# Patient Record
Sex: MALE | Employment: FULL TIME | URBAN - METROPOLITAN AREA
[De-identification: names, ages, dates, MRNs, and addresses within clinical notes are randomized per-mention and may not be internally consistent; named-entity substitution may affect disease eponyms.]

---

## 2021-04-20 DIAGNOSIS — Z00.00 ENCOUNTER FOR PREVENTIVE HEALTH EXAMINATION: ICD-10-CM

## 2021-04-29 ENCOUNTER — HOSPITAL ENCOUNTER (OUTPATIENT)
Dept: VASCULAR ULTRASOUND | Facility: HOSPITAL | Age: 61
Discharge: HOME | End: 2021-04-29

## 2021-04-29 ENCOUNTER — HOSPITAL ENCOUNTER (OUTPATIENT)
Dept: NON INVASIVE DIAGNOSTICS | Facility: CLINIC | Age: 61
Discharge: HOME | End: 2021-04-29

## 2021-04-29 ENCOUNTER — HOSPITAL ENCOUNTER (OUTPATIENT)
Dept: CT IMAGING | Facility: HOSPITAL | Age: 61
Discharge: HOME | End: 2021-04-29

## 2021-04-29 ENCOUNTER — OFFICE VISIT (OUTPATIENT)
Dept: FAMILY MEDICINE CLINIC | Facility: CLINIC | Age: 61
End: 2021-04-29

## 2021-04-29 ENCOUNTER — APPOINTMENT (OUTPATIENT)
Dept: LAB | Facility: CLINIC | Age: 61
End: 2021-04-29

## 2021-04-29 ENCOUNTER — TRANSCRIBE ORDERS (OUTPATIENT)
Dept: LAB | Facility: CLINIC | Age: 61
End: 2021-04-29

## 2021-04-29 ENCOUNTER — HOSPITAL ENCOUNTER (OUTPATIENT)
Dept: ULTRASOUND IMAGING | Facility: HOSPITAL | Age: 61
Discharge: HOME | End: 2021-04-29

## 2021-04-29 VITALS
TEMPERATURE: 96.8 F | HEART RATE: 68 BPM | RESPIRATION RATE: 12 BRPM | OXYGEN SATURATION: 97 % | DIASTOLIC BLOOD PRESSURE: 80 MMHG | HEIGHT: 75 IN | WEIGHT: 239.2 LBS | SYSTOLIC BLOOD PRESSURE: 126 MMHG | BODY MASS INDEX: 29.74 KG/M2

## 2021-04-29 DIAGNOSIS — Z00.00 ENCOUNTER FOR PREVENTIVE HEALTH EXAMINATION: ICD-10-CM

## 2021-04-29 DIAGNOSIS — Z79.01 CHRONIC ANTICOAGULATION: ICD-10-CM

## 2021-04-29 DIAGNOSIS — Z86.718 HISTORY OF DVT (DEEP VEIN THROMBOSIS): ICD-10-CM

## 2021-04-29 DIAGNOSIS — E78.5 DYSLIPIDEMIA: Primary | ICD-10-CM

## 2021-04-29 DIAGNOSIS — B35.3 TINEA PEDIS OF BOTH FEET: ICD-10-CM

## 2021-04-29 LAB
25(OH)D3 SERPL-MCNC: 42.8 NG/ML (ref 30–100)
ALBUMIN SERPL BCP-MCNC: 4.5 G/DL (ref 3.5–5)
ALP SERPL-CCNC: 55 U/L (ref 46–116)
ALT SERPL W P-5'-P-CCNC: 18 U/L (ref 12–78)
ANION GAP SERPL CALCULATED.3IONS-SCNC: 9 MMOL/L (ref 4–13)
AST SERPL W P-5'-P-CCNC: 13 U/L (ref 5–45)
ATRIAL RATE: 64 BPM
BACTERIA UR QL AUTO: ABNORMAL /HPF
BASOPHILS # BLD AUTO: 0.06 THOUSANDS/ΜL (ref 0–0.1)
BASOPHILS NFR BLD AUTO: 1 % (ref 0–1)
BILIRUB SERPL-MCNC: 2.63 MG/DL (ref 0.2–1)
BILIRUB UR QL STRIP: NEGATIVE
BUN SERPL-MCNC: 16 MG/DL (ref 5–25)
CALCIUM SERPL-MCNC: 9 MG/DL (ref 8.3–10.1)
CHLORIDE SERPL-SCNC: 103 MMOL/L (ref 100–108)
CHOLEST SERPL-MCNC: 202 MG/DL (ref 50–200)
CLARITY UR: CLEAR
CO2 SERPL-SCNC: 28 MMOL/L (ref 21–32)
COLOR UR: YELLOW
CREAT SERPL-MCNC: 1.03 MG/DL (ref 0.6–1.3)
CRP SERPL HS-MCNC: 1.58 MG/L
EOSINOPHIL # BLD AUTO: 0.13 THOUSAND/ΜL (ref 0–0.61)
EOSINOPHIL NFR BLD AUTO: 2 % (ref 0–6)
ERYTHROCYTE [DISTWIDTH] IN BLOOD BY AUTOMATED COUNT: 11.9 % (ref 11.6–15.1)
EST. AVERAGE GLUCOSE BLD GHB EST-MCNC: 100 MG/DL
GFR SERPL CREATININE-BSD FRML MDRD: 79 ML/MIN/1.73SQ M
GLUCOSE P FAST SERPL-MCNC: 96 MG/DL (ref 65–99)
GLUCOSE UR STRIP-MCNC: NEGATIVE MG/DL
HBA1C MFR BLD: 5.1 %
HCT VFR BLD AUTO: 50.7 % (ref 36.5–49.3)
HCV AB SER QL: NORMAL
HDLC SERPL-MCNC: 58 MG/DL
HGB BLD-MCNC: 17.2 G/DL (ref 12–17)
HGB UR QL STRIP.AUTO: NEGATIVE
IMM GRANULOCYTES # BLD AUTO: 0.05 THOUSAND/UL (ref 0–0.2)
IMM GRANULOCYTES NFR BLD AUTO: 1 % (ref 0–2)
KETONES UR STRIP-MCNC: NEGATIVE MG/DL
LDLC SERPL CALC-MCNC: 126 MG/DL (ref 0–100)
LEUKOCYTE ESTERASE UR QL STRIP: NEGATIVE
LYMPHOCYTES # BLD AUTO: 2.04 THOUSANDS/ΜL (ref 0.6–4.47)
LYMPHOCYTES NFR BLD AUTO: 26 % (ref 14–44)
MCH RBC QN AUTO: 30 PG (ref 26.8–34.3)
MCHC RBC AUTO-ENTMCNC: 33.9 G/DL (ref 31.4–37.4)
MCV RBC AUTO: 89 FL (ref 82–98)
MONOCYTES # BLD AUTO: 0.83 THOUSAND/ΜL (ref 0.17–1.22)
MONOCYTES NFR BLD AUTO: 11 % (ref 4–12)
NEUTROPHILS # BLD AUTO: 4.71 THOUSANDS/ΜL (ref 1.85–7.62)
NEUTS SEG NFR BLD AUTO: 59 % (ref 43–75)
NITRITE UR QL STRIP: NEGATIVE
NON-SQ EPI CELLS URNS QL MICRO: ABNORMAL /HPF
NRBC BLD AUTO-RTO: 0 /100 WBCS
P AXIS: 67 DEGREES
PH UR STRIP.AUTO: 6.5 [PH]
PLATELET # BLD AUTO: 219 THOUSANDS/UL (ref 149–390)
PMV BLD AUTO: 9.2 FL (ref 8.9–12.7)
POTASSIUM SERPL-SCNC: 3.7 MMOL/L (ref 3.5–5.3)
PR INTERVAL: 158 MS
PROT SERPL-MCNC: 7.5 G/DL (ref 6.4–8.2)
PROT UR STRIP-MCNC: NEGATIVE MG/DL
PSA SERPL-MCNC: 1.8 NG/ML (ref 0–4)
QRS AXIS: 29 DEGREES
QRSD INTERVAL: 110 MS
QT INTERVAL: 416 MS
QTC INTERVAL: 429 MS
RBC # BLD AUTO: 5.73 MILLION/UL (ref 3.88–5.62)
RBC #/AREA URNS AUTO: ABNORMAL /HPF
SODIUM SERPL-SCNC: 140 MMOL/L (ref 136–145)
SP GR UR STRIP.AUTO: <=1.005 (ref 1–1.03)
T WAVE AXIS: 45 DEGREES
TRIGL SERPL-MCNC: 90 MG/DL
TSH SERPL DL<=0.05 MIU/L-ACNC: 1.72 UIU/ML (ref 0.36–3.74)
UROBILINOGEN UR QL STRIP.AUTO: 0.2 E.U./DL
VENTRICULAR RATE: 64 BPM
WBC # BLD AUTO: 7.82 THOUSAND/UL (ref 4.31–10.16)
WBC #/AREA URNS AUTO: ABNORMAL /HPF

## 2021-04-29 PROCEDURE — 93922 UPR/L XTREMITY ART 2 LEVELS: CPT

## 2021-04-29 PROCEDURE — 93350 STRESS TTE ONLY: CPT

## 2021-04-29 PROCEDURE — 93351 STRESS TTE COMPLETE: CPT | Performed by: INTERNAL MEDICINE

## 2021-04-29 PROCEDURE — 75571 CT HRT W/O DYE W/CA TEST: CPT

## 2021-04-29 PROCEDURE — 85025 COMPLETE CBC W/AUTO DIFF WBC: CPT

## 2021-04-29 PROCEDURE — 81001 URINALYSIS AUTO W/SCOPE: CPT

## 2021-04-29 PROCEDURE — VASC: Performed by: SURGERY

## 2021-04-29 PROCEDURE — 86141 C-REACTIVE PROTEIN HS: CPT

## 2021-04-29 PROCEDURE — 82306 VITAMIN D 25 HYDROXY: CPT

## 2021-04-29 PROCEDURE — 93306 TTE W/DOPPLER COMPLETE: CPT

## 2021-04-29 PROCEDURE — 80053 COMPREHEN METABOLIC PANEL: CPT

## 2021-04-29 PROCEDURE — 84153 ASSAY OF PSA TOTAL: CPT

## 2021-04-29 PROCEDURE — 93010 ELECTROCARDIOGRAM REPORT: CPT | Performed by: INTERNAL MEDICINE

## 2021-04-29 PROCEDURE — 93306 TTE W/DOPPLER COMPLETE: CPT | Performed by: INTERNAL MEDICINE

## 2021-04-29 PROCEDURE — 86803 HEPATITIS C AB TEST: CPT

## 2021-04-29 PROCEDURE — G1004 CDSM NDSC: HCPCS

## 2021-04-29 PROCEDURE — 99499EX: Performed by: FAMILY MEDICINE

## 2021-04-29 PROCEDURE — 80061 LIPID PANEL: CPT

## 2021-04-29 PROCEDURE — 76700 US EXAM ABDOM COMPLETE: CPT

## 2021-04-29 PROCEDURE — 36415 COLL VENOUS BLD VENIPUNCTURE: CPT

## 2021-04-29 PROCEDURE — 83036 HEMOGLOBIN GLYCOSYLATED A1C: CPT

## 2021-04-29 PROCEDURE — 84443 ASSAY THYROID STIM HORMONE: CPT

## 2021-04-29 PROCEDURE — 93005 ELECTROCARDIOGRAM TRACING: CPT

## 2021-04-29 RX ORDER — KETOCONAZOLE 20 MG/G
CREAM TOPICAL 3 TIMES DAILY
Qty: 30 G | Refills: 0 | Status: SHIPPED | OUTPATIENT
Start: 2021-04-29

## 2021-04-29 RX ORDER — MULTIVIT-MIN/IRON/FOLIC ACID/K 18-600-40
500 CAPSULE ORAL
COMMUNITY

## 2021-04-29 NOTE — PROGRESS NOTES
Hearing Assessment Summary:     Hearing Screening    125Hz 250Hz 500Hz 1000Hz 2000Hz 3000Hz 4000Hz 6000Hz 8000Hz   Right ear:  21 20 20 25 25 25 20 20   Left ear:  13 20 21 25 27 30 30 45   Comments: HEARING EVALUATION    Name:  Beatris Thomas  :  1960  Age:  62 y o  Date of Evaluation: 21     History: [facility] Exam  Reason for visit: Beatris Thomas is being seen today for an evaluation of hearing as part of his [facility] physical   Patient reports no concern over his hearing, and denies ear pain or tinnitus  He notes some lightheadedness upon standing quickly at times  He reports that he saw ENT for scratch in left canal that would not stop bleeding (on blood thinners), but this was not a recent event  EVALUATION:    Otoscopic Evaluation:   Right Ear: Clear and healthy ear canal and tympanic membrane   Left Ear: Mild cerumen    Tympanometry:   Right: Type A - normal middle ear pressure and compliance   Left: Type A - normal middle ear pressure and compliance    Audiogram Results:  Right Ear: Normal peripheral hearing sensitivity  Left Ear: Normal through 2k Hz sloping to mild high frequency sensorineural hearing loss  Excellent speech discrimination ability in each ear  *NOTE 25 dB asymmetry at 8k Hz, with the left ear poorer than right  *see attached audiogram      RECOMMENDATIONS:  Annual hearing eval, Consult ENT, Return to [facility]  for F/U and Copy to Patient/Caregiver    PATIENT EDUCATION:   Discussed results and recommendations with patient  Questions were addressed and the patient was encouraged to contact our department should concerns arise        Hailey Cortes   Clinical Audiologist       Visual Acuity Screening    Right eye Left eye Both eyes   Without correction:      With correction: 20/50 20/40 20/25

## 2021-04-29 NOTE — PROGRESS NOTES
Nutritional Summary and Recommendations:     Arie Alexander presents for nutrition assessment and counseling  Over the past year, Arie Alexander has focused on eating a healthier diet and has been walking regularly and has lost 30 lbs  Arie Alexander reports that he eats more meals from home and has been focusing on eating in moderation  Sometimes dines out on weekends, enjoys eating at [employer]  Current goals include further improving body composition with goal weight of about 225 lbs at this time and continuing current dietary practices for general health and wellness  Ht: 6'2",  Wt: 236 8 lb,  BMI: 30 4,  Tanita BMR: 2090 kcal,  Fat Mass: 66 8 lb,  FFM: 170 0 lb,  Desirable Fat Mass: 48 0 lb  Labs: 4/29/21 A1C 5 1, Cholesterol 202, Triglycerides 90, HDL 58, , Vitamin D 42 8  Meds: Reviewed  Nutrition Diagnosis: Obesity related to excess energy intake over time as evidenced by BMI, fat mass  Goals:  1  Achieve desirable fat mass within 12 months  2  Be mindful of portion sizes at meals, especially when dining out on weekends  3  Aim to include lean protein sources at all meals and snacks as discussed with RD   4  Continue current dietary practices including consistent meal intake throughout the day, eating a variety of fruits and vegetables throughout the day, eating in moderation  5  Contact RD with any questions/concerns      Perceived Comprehension: Very Good  Expected Compliance: Very Good

## 2021-04-29 NOTE — PROGRESS NOTES
HEART AND VASCULAR SUMMARY    1  Blood pressure 126/80 mmHg, Pulse 68    2  Lipids: Total 202, TG 90, HDL 58, , hs C-RP 1 58    3  ECG: Normal sinus rhythm    4  Echocardiogram: Normal cardiac function with no significant valvular abnormalities  Borderline dilated aortic root  5  Stress ECHO: Excellent exercise capacity with no myocardial ischemia  6  Coronary Calcium CT: 26 (51st percentile)  7  Cardiovascular risk score: 7 0% risk of heart disease or stroke over next 10 years  Impression and Recommendations:    Mr Pablo Montes is a 62year old man with history of dyslipidemia and DVT who presents for an [facility]   Asymptomatic from a cardiac standpoint  1  Good overall cardiovascular health/exercise capacity  2  Borderline dyslipidemia -    3  Borderline dilated aortic root - recheck CT chest in 5 years

## 2021-04-29 NOTE — PROGRESS NOTES
Fitness Summary and Recommendations:  Brigette Bass scored at the 15% on his body composition assessment with a bodyfat % of 28 2%  Brigette Bass scored in the average range for flexibility with a sit & reach score of 20 cm  His Muscle Strength/Endurance scores placed him at the 65% (lower body) and 90% (upper body) with a chair stand score of 22 and arm curl score of 26 respectively  Ronnys Cardiovascular Score of 48 8 placed him at the 95%  Overall Brigette Bass would be considered to have an above average fitness level with a 61% score  Continued emphasis on regular exercise and sound nutrition practices will enable Brigette Bass to reach his optimal level of fitness

## 2023-07-08 NOTE — PROGRESS NOTES
[facility] Physical Exam     Riaz Guerrero is a 62 y o  male who is presenting for an [facility] Physical Exam at [address] [place] [address]  This is his 1st [facility] physical   He feels well  He has no new concerns or complaints today  He does have past history of multiple DVTs and 1 pulmonary embolism  He is taking Xarelto and has been advised to continue with lifelong anticoagulation  He has lost approximately 30 lb in the last year due to lifestyle changes  He exercises regularly and follows a healthy diet  Review of Systems   Constitutional: Negative  HENT: Negative  Negative for congestion, ear pain, hearing loss, nosebleeds, sore throat and trouble swallowing  Eyes: Negative  Respiratory: Negative for apnea, cough, chest tightness, shortness of breath and wheezing  Cardiovascular: Negative  Gastrointestinal: Negative for abdominal pain, blood in stool, constipation, diarrhea, nausea and vomiting  Endocrine: Negative  Genitourinary: Negative for difficulty urinating, dysuria, frequency, hematuria and urgency  Musculoskeletal: Negative for arthralgias, joint swelling and myalgias  Skin: Negative for rash  Neurological: Negative for dizziness, syncope, light-headedness, numbness and headaches  Hematological: Negative  Psychiatric/Behavioral: Negative for confusion, dysphoric mood and sleep disturbance  The patient is not nervous/anxious            Active Ambulatory Problems     Diagnosis Date Noted    Diverticulitis of large intestine without perforation or abscess without bleeding 04/12/2016    Dyslipidemia 12/30/2010    BMI 33 0-33 9,adult 02/19/2018    History of DVT (deep vein thrombosis) 04/29/2021    Chronic anticoagulation 04/29/2021     Resolved Ambulatory Problems     Diagnosis Date Noted    No Resolved Ambulatory Problems     Past Medical History:   Diagnosis Date    DVT (deep venous thrombosis) ([facility] [address] ) 0108,1662    PE Regarding: projectile vomited/no fever  ----- Message from Maicol Cameron sent at 7/7/2023  9:06 PM EDT -----  Pt mother called "My daughter woke up and has projectile vomited everywhere.  She doesn't have a fever." (pulmonary thromboembolism) [facility] 2012       History reviewed  No pertinent surgical history  Family History   Problem Relation Age of Onset    Hypertension Mother     COPD Father        Social History     Tobacco Use   Smoking Status Never Smoker   Smokeless Tobacco Former User    Types: Chew         Current Outpatient Medications:     Ascorbic Acid (Vitamin C) 500 MG CAPS, Take 500 mg by mouth, Disp: , Rfl:     Cholecalciferol 50 MCG (2000 UT) TABS, Take 1 tablet by mouth, Disp: , Rfl:     rivaroxaban (XARELTO) 20 mg tablet, Take 20 mg by mouth, Disp: , Rfl:     Not on File      Objective:    Vitals:    04/29/21 1049   BP: 126/80   BP Location: Left arm   Patient Position: Sitting   Cuff Size: Standard   Pulse: 68   Resp: 12   Temp: (!) 96 8 °F (36 °C)   TempSrc: Tympanic   SpO2: 97%   Weight: 109 kg (239 lb 3 2 oz)   Height: 6' 3" (1 905 m)        Physical Exam  Vitals signs and nursing note reviewed  Constitutional:       General: He is not in acute distress  Appearance: Normal appearance  He is well-developed  He is not diaphoretic  HENT:      Head: Normocephalic and atraumatic  Right Ear: Hearing, tympanic membrane and external ear normal       Left Ear: Hearing, tympanic membrane, ear canal and external ear normal       Nose: Nose normal  No nasal deformity or rhinorrhea  Right Sinus: No maxillary sinus tenderness or frontal sinus tenderness  Left Sinus: No maxillary sinus tenderness or frontal sinus tenderness  Mouth/Throat:      Mouth: No oral lesions  Dentition: Normal dentition  Pharynx: Uvula midline  No oropharyngeal exudate or posterior oropharyngeal erythema  Eyes:      General: Lids are normal          Right eye: No discharge  Conjunctiva/sclera: Conjunctivae normal       Pupils: Pupils are equal, round, and reactive to light  Neck:      Musculoskeletal: Normal range of motion and neck supple  Thyroid: No thyroid mass or thyromegaly  Vascular: No carotid bruit or JVD  Trachea: Trachea normal    Cardiovascular:      Rate and Rhythm: Normal rate and regular rhythm  Pulses: Normal pulses  Carotid pulses are 2+ on the right side and 2+ on the left side  Radial pulses are 2+ on the right side and 2+ on the left side  Heart sounds: No murmur  Pulmonary:      Effort: Pulmonary effort is normal  No accessory muscle usage or respiratory distress  Breath sounds: Normal breath sounds  Abdominal:      General: Bowel sounds are normal  There is no distension  Palpations: Abdomen is soft  There is no mass  Tenderness: There is no abdominal tenderness  Hernia: No hernia is present  Musculoskeletal: Normal range of motion  General: No tenderness or deformity  Lymphadenopathy:      Cervical: No cervical adenopathy  Skin:     General: Skin is warm and dry  Findings: No lesion or rash  Neurological:      Mental Status: He is alert and oriented to person, place, and time  Cranial Nerves: No cranial nerve deficit  Sensory: No sensory deficit  Motor: No weakness  Coordination: Coordination normal       Deep Tendon Reflexes: Reflexes are normal and symmetric  Reflexes normal    Psychiatric:         Speech: Speech normal          Behavior: Behavior normal          Thought Content: Thought content normal          Judgment: Judgment normal              Assessment/Plan:     1  Dyslipidemia    2  History of DVT (deep vein thrombosis)    3  Chronic anticoagulation    4  Tinea pedis of both feet         [facility] Summary:      1  Lipids   Total cholesterol is slightly elevated at 200 and to however LDL cholesterol, the bad cholesterol is fine at 126 and HDL, good cholesterol is very good at 58     2  Dermatology  The dermatologist noted some mild foot fungus infection  Ketoconazole cream has been ordered  That should be applied to the feet 3 times a day for 2 weeks  The dermatologist also noted a lesion on your left mid back  This may be a benign skin lesion but he does recommend follow-up with a dermatologist for a biopsy to confirm that  That can be done with a local dermatologist in the [place] area or a referral  can be arranged here for a [address]     3  Urinalysis  The urine test did show a very small amount of white blood cells in the sample  This is very common and in the absence of any urinary symptoms does not require any additional testing or follow-up  4  Elevated bilirubin  Bilirubin is produced by the liver and when elevated significantly can cause a yellowing of the skin and eyes called jaundice  The elevation seen today is likely due to Gilbert's syndrome which is a completely benign and harmless condition which causes a slight increase in bilirubin but not in a way that causes any harm  There is no treatment necessary for this